# Patient Record
Sex: MALE | Race: OTHER | Employment: UNEMPLOYED | URBAN - METROPOLITAN AREA
[De-identification: names, ages, dates, MRNs, and addresses within clinical notes are randomized per-mention and may not be internally consistent; named-entity substitution may affect disease eponyms.]

---

## 2018-03-30 ENCOUNTER — APPOINTMENT (OUTPATIENT)
Dept: OCCUPATIONAL MEDICINE | Age: 28
End: 2018-03-30
Attending: EMERGENCY MEDICINE

## 2021-06-01 ENCOUNTER — HOSPITAL ENCOUNTER (OUTPATIENT)
Age: 31
Discharge: HOME | End: 2021-06-01
Payer: COMMERCIAL

## 2021-06-01 VITALS
TEMPERATURE: 98 F | OXYGEN SATURATION: 99 % | SYSTOLIC BLOOD PRESSURE: 109 MMHG | RESPIRATION RATE: 16 BRPM | DIASTOLIC BLOOD PRESSURE: 69 MMHG | HEART RATE: 72 BPM

## 2021-06-01 DIAGNOSIS — M54.40 BACK PAIN OF LUMBAR REGION WITH SCIATICA: Primary | ICD-10-CM

## 2021-06-01 PROCEDURE — 99203 OFFICE O/P NEW LOW 30 MIN: CPT

## 2021-06-01 PROCEDURE — 82962 GLUCOSE BLOOD TEST: CPT

## 2021-06-01 RX ORDER — PREDNISONE 20 MG/1
40 TABLET ORAL DAILY
Qty: 10 TABLET | Refills: 0 | Status: SHIPPED | OUTPATIENT
Start: 2021-06-01 | End: 2021-06-06

## 2021-06-01 RX ORDER — CYCLOBENZAPRINE HCL 10 MG
10 TABLET ORAL 3 TIMES DAILY PRN
Qty: 20 TABLET | Refills: 0 | Status: SHIPPED | OUTPATIENT
Start: 2021-06-01 | End: 2021-06-08

## 2021-06-01 NOTE — ED INITIAL ASSESSMENT (HPI)
Pt here w/ c/o pain to lower back. Has a hx of this pain and lifted a box on Saturday causing pain to flair. Pain goes down left leg. Ache at rest, sharp w/ any movement.

## 2021-06-01 NOTE — ED PROVIDER NOTES
Patient Seen in: [facility]      History   Patient presents with:  Back Pain    Stated Complaint: PINCHED NEVER ON LOWER BACK    HPI/Subjective:   HPI  32-year-old male presents to the immediate care complaining of left lower back pain th effort is normal.      Breath sounds: Normal breath sounds. Abdominal:      Tenderness: There is no abdominal tenderness. Musculoskeletal:      Comments: Right-sided lumbar paraspinal tenderness with pain over the sciatic notch.   Lower extremity streng

## 2024-11-12 ENCOUNTER — HOSPITAL ENCOUNTER (EMERGENCY)
Facility: HOSPITAL | Age: 34
Discharge: HOME | End: 2024-11-12
Attending: EMERGENCY MEDICINE

## 2024-11-12 VITALS
OXYGEN SATURATION: 96 % | HEART RATE: 70 BPM | DIASTOLIC BLOOD PRESSURE: 74 MMHG | TEMPERATURE: 98 F | SYSTOLIC BLOOD PRESSURE: 113 MMHG | RESPIRATION RATE: 17 BRPM

## 2024-11-12 DIAGNOSIS — T78.40XA ALLERGIC REACTION, INITIAL ENCOUNTER: Primary | ICD-10-CM

## 2024-11-12 PROCEDURE — 99284 EMERGENCY DEPT VISIT MOD MDM: CPT

## 2024-11-12 PROCEDURE — 96360 HYDRATION IV INFUSION INIT: CPT

## 2024-11-12 RX ORDER — DIPHENHYDRAMINE HCL 25 MG
50 CAPSULE ORAL ONCE
Status: COMPLETED | OUTPATIENT
Start: 2024-11-12 | End: 2024-11-12

## 2024-11-12 RX ORDER — EPINEPHRINE 0.3 MG/.3ML
0.3 INJECTION SUBCUTANEOUS ONCE AS NEEDED
Qty: 1 EACH | Refills: 0 | Status: SHIPPED | OUTPATIENT
Start: 2024-11-12 | End: 2024-11-12

## 2024-11-12 RX ORDER — METHYLPREDNISOLONE 4 MG/1
TABLET ORAL
Qty: 1 EACH | Refills: 0 | Status: SHIPPED | OUTPATIENT
Start: 2024-11-12

## 2024-11-12 RX ORDER — PREDNISONE 20 MG/1
60 TABLET ORAL ONCE
Status: COMPLETED | OUTPATIENT
Start: 2024-11-12 | End: 2024-11-12

## 2024-11-12 RX ORDER — FAMOTIDINE 20 MG/1
20 TABLET, FILM COATED ORAL ONCE
Status: COMPLETED | OUTPATIENT
Start: 2024-11-12 | End: 2024-11-12

## 2024-11-12 RX ORDER — HYDROXYZINE HYDROCHLORIDE 25 MG/1
TABLET, FILM COATED ORAL EVERY 8 HOURS PRN
Qty: 20 TABLET | Refills: 0 | Status: SHIPPED | OUTPATIENT
Start: 2024-11-12 | End: 2024-12-12

## 2024-11-12 RX ORDER — DIPHENHYDRAMINE HCL 25 MG
CAPSULE ORAL
Status: COMPLETED
Start: 2024-11-12 | End: 2024-11-12

## 2024-11-12 RX ORDER — FAMOTIDINE 20 MG/1
TABLET, FILM COATED ORAL
Status: COMPLETED
Start: 2024-11-12 | End: 2024-11-12

## 2024-11-12 NOTE — ED PROVIDER NOTES
Patient Seen in: [facility] Emergency Department      History     Chief Complaint   Patient presents with    Allergic Rxn Allergies     Stated Complaint: Allergic Reaction, Swelling    Subjective:   HPI      34 year old male otherwise healthy who presents with allergic reaction is started approximately 1 hour prior to arrival.  Patient states he has never had an allergic reaction before, has no idea what started this particular episode, states he did not eat anything different today and has not been exposed to any new detergents, other environmental factors or known irritants.  He felt like his throat was swelling and his face was swollen along with hives to his upper arms.  He received prednisone, Benadryl, Pepcid in triage and he states he is already feeling better with no further throat tightness but still feels that his face specially around his eyes are swollen.    Objective:     History reviewed. No pertinent past medical history.           History reviewed. No pertinent surgical history.             Social History     Socioeconomic History    Marital status: Single   Tobacco Use    Smoking status: Never    Smokeless tobacco: Never     Social Drivers of Health      Received from [facility]                  Physical Exam     ED Triage Vitals   BP 11/12/24 0213 120/85   Pulse 11/12/24 0213 81   Resp 11/12/24 0213 20   Temp 11/12/24 0213 98 °F (36.7 °C)   Temp src --    SpO2 11/12/24 0213 96 %   O2 Device 11/12/24 0240 None (Room air)       Current Vitals:   Vital Signs  BP: 120/85  Pulse: 81  Resp: 20  Temp: 98 °F (36.7 °C)    Oxygen Therapy  SpO2: 96 %  O2 Device: None (Room air)        Physical Exam  Vitals and nursing note reviewed.   Constitutional:       General: He is not in acute distress.     Appearance: Normal appearance. He is not ill-appearing or diaphoretic.   HENT:      Head: Normocephalic and atraumatic.      Jaw: There is normal jaw occlusion.      Right Ear: External ear  normal.      Left Ear: External ear normal.      Nose: Nose normal. No mucosal edema.      Right Turbinates: Not enlarged or swollen.      Left Turbinates: Not enlarged or swollen.      Mouth/Throat:      Mouth: Mucous membranes are moist. No angioedema.      Pharynx: Oropharynx is clear. No posterior oropharyngeal erythema or uvula swelling.   Eyes:      Comments: Mild generalized swelling around both eyes, no erythema, no signs of periorbital cellulitis.  No hives to the face.  No oropharyngeal swelling   Neck:      Trachea: Phonation normal.   Cardiovascular:      Rate and Rhythm: Normal rate and regular rhythm.      Pulses: Normal pulses.   Pulmonary:      Effort: No tachypnea or respiratory distress.      Breath sounds: Normal breath sounds and air entry. No stridor or decreased air movement. No wheezing.   Abdominal:      Palpations: Abdomen is soft.      Tenderness: There is no abdominal tenderness.   Musculoskeletal:      Cervical back: Full passive range of motion without pain and normal range of motion.   Skin:     Findings: Rash present. Rash is urticarial.      Comments: Few small hives to elbows   Neurological:      Mental Status: He is alert.             ED Course   Labs Reviewed - No data to display           MDM      Pulse Ox: 96%, Normal, RA    Medications   diphenhydrAMINE (Benadryl) cap/tab 50 mg (50 mg Oral Given 11/12/24 0217)   famotidine (Pepcid) tab 20 mg (20 mg Oral Given 11/12/24 0217)   predniSONE (Deltasone) tab 60 mg (60 mg Oral Given 11/12/24 0217)   sodium chloride 0.9 % IV bolus 1,000 mL (1,000 mL Intravenous New Bag 11/12/24 0254)     Patient with symptoms of allergic reaction, hives, itching, swelling to his eyes and face, and initially feeling like his throat was getting tight at home.  He was given oral meds in triage and felt better, did not feel any further throat tightening or shortness of breath after that and therefore was not given any epinephrine in the ER.  Patient felt  better with Benadryl, Pepcid, prednisone and fluids, still has some swelling around his eyes but otherwise resting comfortably, vital signs stable.  We discussed anaphylactic reaction, will prescribe EpiPen.  Unclear with the patient was allergic to, advised him to look around his home, wash sheets and try to determine what may have been an inciting factor.  Return for any worsening symptoms otherwise follow-up with PCP recommendation.        Disposition and Plan     Clinical Impression:  1. Allergic reaction, initial encounter         Disposition:  Discharge  11/12/2024  4:23 am    Follow-up:  Weiler, Colleen M, DO  [address] [place] [id number]  [phone]    Schedule an appointment as soon as possible for a visit  Call for next available appointment, If you need a new primary care provider          Medications Prescribed:  Current Discharge Medication List        START taking these medications    Details   methylPREDNISolone (MEDROL) 4 MG Oral Tablet Therapy Pack Dosepack: take as directed  Qty: 1 each, Refills: 0      hydrOXYzine 25 MG Oral Tab Take 1-2 tablets (25-50 mg total) by mouth every 8 (eight) hours as needed for Itching.  Qty: 20 tablet, Refills: 0      EPINEPHrine 0.3 MG/0.3ML Injection Solution Auto-injector Inject 0.3 mL (1 each total) into the muscle once as needed (anaphylaxis).  Qty: 1 each, Refills: 0                 Supplementary Documentation:

## 2025-04-06 ENCOUNTER — HOSPITAL ENCOUNTER (EMERGENCY)
Facility: HOSPITAL | Age: 35
Discharge: HOME | End: 2025-04-06
Attending: STUDENT IN AN ORGANIZED HEALTH CARE EDUCATION/TRAINING PROGRAM

## 2025-04-06 VITALS
SYSTOLIC BLOOD PRESSURE: 152 MMHG | HEIGHT: 70 IN | TEMPERATURE: 99 F | WEIGHT: 260 LBS | RESPIRATION RATE: 17 BRPM | HEART RATE: 75 BPM | BODY MASS INDEX: 37.22 KG/M2 | DIASTOLIC BLOOD PRESSURE: 87 MMHG | OXYGEN SATURATION: 99 %

## 2025-04-06 DIAGNOSIS — M54.31 SCIATICA OF RIGHT SIDE: Primary | ICD-10-CM

## 2025-04-06 PROCEDURE — 96372 THER/PROPH/DIAG INJ SC/IM: CPT

## 2025-04-06 PROCEDURE — 99284 EMERGENCY DEPT VISIT MOD MDM: CPT

## 2025-04-06 PROCEDURE — 99283 EMERGENCY DEPT VISIT LOW MDM: CPT

## 2025-04-06 RX ORDER — MORPHINE SULFATE 4 MG/ML
4 INJECTION, SOLUTION INTRAMUSCULAR; INTRAVENOUS ONCE
Status: COMPLETED | OUTPATIENT
Start: 2025-04-06 | End: 2025-04-06

## 2025-04-06 RX ORDER — ORPHENADRINE CITRATE 100 MG/1
100 TABLET ORAL 2 TIMES DAILY
Qty: 14 EACH | Refills: 0 | Status: SHIPPED | OUTPATIENT
Start: 2025-04-06 | End: 2025-04-13

## 2025-04-06 RX ORDER — KETOROLAC TROMETHAMINE 10 MG/1
10 TABLET, FILM COATED ORAL EVERY 6 HOURS PRN
Qty: 28 TABLET | Refills: 0 | Status: SHIPPED | OUTPATIENT
Start: 2025-04-06 | End: 2025-04-13

## 2025-04-06 RX ORDER — ACETAMINOPHEN 500 MG
1000 TABLET ORAL ONCE
Status: COMPLETED | OUTPATIENT
Start: 2025-04-06 | End: 2025-04-06

## 2025-04-06 RX ORDER — METHYLPREDNISOLONE 4 MG/1
TABLET ORAL
Qty: 1 EACH | Refills: 0 | Status: SHIPPED | OUTPATIENT
Start: 2025-04-06

## 2025-04-06 RX ORDER — ORPHENADRINE CITRATE 30 MG/ML
60 INJECTION INTRAMUSCULAR; INTRAVENOUS ONCE
Status: COMPLETED | OUTPATIENT
Start: 2025-04-06 | End: 2025-04-06

## 2025-04-07 NOTE — ED PROVIDER NOTES
Patient Seen in: [facility] Emergency Department      History     Chief Complaint   Patient presents with    Back Pain     Stated Complaint: back pain    Subjective:   HPI      34-year-old female presenting for evaluation of back pain.  Brought in by EMS from home.  Onset of symptoms this morning while he was shaving and bent over the sink, developed pain in right low back radiating down right lower extremity.  No numbness weakness or tingling.  Pain not relieved with Tylenol or ibuprofen at home.  Had similar pain previously which responded to medications and physical therapy.  No history of back surgery or injections.  No bowel or bladder incontinence.    Objective:     Past Medical History:    Back pain              History reviewed. No pertinent surgical history.             Social History     Socioeconomic History    Marital status: Single   Tobacco Use    Smoking status: Never    Smokeless tobacco: Never   Substance and Sexual Activity    Alcohol use: Never    Drug use: Never     Social Drivers of Health      Received from [facility]    [facility] [address]                  Physical Exam     ED Triage Vitals [04/06/25 2058]   /72   Pulse 75   Resp 17   Temp 98.7 °F (37.1 °C)   Temp src Oral   SpO2 99 %   O2 Device None (Room air)       Current Vitals:   Vital Signs  BP: 152/87  Pulse: 75  Resp: 17  Temp: 98.7 °F (37.1 °C)  Temp src: Oral  MAP (mmHg): 94    Oxygen Therapy  SpO2: 99 %  O2 Device: None (Room air)        Physical Exam  Constitutional: awake, alert, no sig distress  HENT: mmm, no lesions,  Neck: normal range of motion, no tenderness, supple.  Eyes: PERRL, EOMI, conjunctiva normal, no discharge. Sclera anicteric.  Cardiovascular: rr no murmur  Respiratory: Normal breath sounds, no respiratory distress, no wheezing, no chest tenderness.  GI: Bowel sounds normal, Soft, no tenderness, no masses, no pulsatile masses.  : No CVA tenderness.  Skin: Warm, dry, no erythema, no  rash.  Musculoskeletal: Intact distal pulses, no edema, no tenderness, no cyanosis, no clubbing. Good range of motion in all major joints. No tenderness to palpation or major deformities noted. Back-tender to palpation right lumbar paraspinal region positive right SLR    Neurologic: Alert & oriented x 3, normal motor function, normal sensory function, no focal deficits noted.  Psych: Calm, cooperative, nl affect    ED Course   Labs Reviewed - No data to display                MDM      34-year-old male with history as documented above presenting with signs symptoms compatible with right lumbosacral sciatica.  On arrival vitals are stable and reassuring  Ddx: Sciatica, lumbar strain, sacroiliitis  - Do not suspect cord compression based on history and exam  - Given morphine, Norflex, Tylenol lidocaine patch  Plan for symptomatic therapies outpatient follow-up.  Return precautions and follow-up instructions were discussed with patient who voiced understanding and agreement the plan.  All questions were answered to patient satisfaction.        MDM    Disposition and Plan     Clinical Impression:  1. Sciatica of right side         Disposition:  Discharge  4/6/2025  9:37 pm    Follow-up:  [facility] Emergency Department  [address]  [facility] [address]  [phone]  Follow up  As needed, If symptoms worsen    Camilo Cowan MD  [address] [place] [address] [address]  [facility] [address]  [phone]    Call            Medications Prescribed:  Discharge Medication List as of 4/6/2025 10:00 PM        START taking these medications    Details   orphenadrine  MG Oral Tablet 12 Hr Take 100 mg by mouth 2 (two) times daily for 7 days., Normal, Disp-14 each, R-0      !! methylPREDNISolone (MEDROL) 4 MG Oral Tablet Therapy Pack Dosepack: take as directed, Normal, Disp-1 each, R-0      Ketorolac Tromethamine 10 MG Oral Tab Take 1 tablet (10 mg total) by mouth every 6 (six) hours as needed for Pain., Normal, Disp-28  tablet, R-0       !! - Potential duplicate medications found. Please discuss with provider.              Supplementary Documentation:

## 2025-04-07 NOTE — ED NOTES
Pt alert, oriented x4.  Arrived from home via EMS - will have a ride home.  Chief complaint is back pain.    Pt states pain started this morning when he was shaving.  Has disc space narrowing and hypertrophy of L5-S1 after a work injury 2 years ago.  Has had flare-ups before - this is the second time the pain gets bad enough to cause back spasms with pain shooting down posterior BLE.  Intermittent, at lowest level rated 3/10 goes up to 10/10.    Took 800mg ibuprofen around 17:30.  Occasional heat helps briefly, as does lying prone with a pillow under his pelvis.    Pt changed into gown and attached to NIBP and SpO2 monitors.  Warm blanket given.  Call light within reach.  Cart low, in locked position.

## 2025-04-07 NOTE — ED INITIAL ASSESSMENT (HPI)
Patient to the ED via [facility] for complaint of back pain. Pt has a herniated disc to L4 and is having a flair up. Pt states two years ago he was injured at work and did therapy and different things and today couldn't get off the ground when it started hurting. Pain is right lower side of back. AO 4

## (undated) NOTE — LETTER
Date & Time: 4/6/2025, 10:02 PM  Patient: Igor Buenrostro  Encounter Provider(s):    Luis Alfredo Ray MD       To Whom It May Concern:    Igor Buenrostro was seen and treated in our department on 4/6/2025. He can return to work on Friday, April 11, 2025.    If you have any questions or concerns, please do not hesitate to call.        _____________________________  Physician/APC Signature

## (undated) NOTE — LETTER
Date & Time: 6/1/2021, 6:24 PM  Patient: Dagmar Shaw  Encounter Provider(s):    DESIREE Montano       To Whom It May Concern:    Dagmar Shaw was seen and treated in our department on 6/1/2021. He may return to work 6/3/2021.     If